# Patient Record
Sex: FEMALE | Race: OTHER | NOT HISPANIC OR LATINO | Employment: STUDENT | URBAN - METROPOLITAN AREA
[De-identification: names, ages, dates, MRNs, and addresses within clinical notes are randomized per-mention and may not be internally consistent; named-entity substitution may affect disease eponyms.]

---

## 2024-01-18 ENCOUNTER — HOSPITAL ENCOUNTER (EMERGENCY)
Facility: HOSPITAL | Age: 49
Discharge: HOME | End: 2024-01-18
Attending: EMERGENCY MEDICINE

## 2024-01-18 VITALS
DIASTOLIC BLOOD PRESSURE: 55 MMHG | RESPIRATION RATE: 18 BRPM | HEART RATE: 75 BPM | OXYGEN SATURATION: 99 % | WEIGHT: 136.69 LBS | HEIGHT: 63 IN | BODY MASS INDEX: 24.22 KG/M2 | SYSTOLIC BLOOD PRESSURE: 113 MMHG | TEMPERATURE: 98 F
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
## 2024-01-18 DIAGNOSIS — S82.832A CLOSED FRACTURE OF DISTAL END OF LEFT FIBULA, UNSPECIFIED FRACTURE MORPHOLOGY, INITIAL ENCOUNTER: Primary | ICD-10-CM

## 2024-01-18 PROCEDURE — 99283 EMERGENCY DEPT VISIT LOW MDM: CPT | Mod: 25

## 2024-01-18 PROCEDURE — 25000003 PHARM REV CODE 250: Performed by: PHYSICIAN ASSISTANT

## 2024-01-18 PROCEDURE — 29515 APPLICATION SHORT LEG SPLINT: CPT | Mod: LT

## 2024-01-18 RX ORDER — ACETAMINOPHEN 325 MG/1
650 TABLET ORAL
Status: COMPLETED | OUTPATIENT
Start: 2024-01-18 | End: 2024-01-18

## 2024-01-18 RX ADMIN — ACETAMINOPHEN 650 MG: 325 TABLET ORAL at 12:01

## 2024-01-18 NOTE — ED PROVIDER NOTES
Encounter Date: 1/18/2024       History     Chief Complaint   Patient presents with    Fall     At local [employer].  Let ankle pain.  Fall down stairs.  Roughly six steps according to patient.  No LOC.  Did not hit head.  No anti-coagulants.       48-year-old female presents ED with concern of left-sided ankle pain after injury that occurred after she slipped while walking downstairs.  Denying any other associated injuries.  Pain is sharp, worse with weight-bearing, severity 7/10.  No numbness or focal weakness.  No other acute complaints at this time.    The history is provided by the patient.     Review of patient's allergies indicates:  No Known Allergies  No past medical history on file.  No past surgical history on file.  No family history on file.     Review of Systems   Gastrointestinal:  Negative for nausea and vomiting.   Musculoskeletal:  Positive for myalgias.   Skin:  Negative for wound.   Neurological:  Negative for weakness and numbness.       Physical Exam     Initial Vitals   BP Pulse Resp Temp SpO2   01/18/24 1116 01/18/24 1116 01/18/24 1116 01/18/24 1118 01/18/24 1116   (!) 149/72 80 16 97.9 °F (36.6 °C) 100 %      MAP       --                Physical Exam    Nursing note and vitals reviewed.  Constitutional: She appears well-developed and well-nourished. She is active. She does not have a sickly appearance. She does not appear ill. No distress.   HENT:   Head: Normocephalic and atraumatic.   Musculoskeletal:      Comments: Left ankle tenderness reported over medial and lateral malleolus with localized swelling.  No large deformity noted.  Mild tenderness extending to dorsal midfoot.  Sensation intact throughout all toes.  DP pulse intact.     Neurological: She is alert. GCS eye subscore is 4. GCS verbal subscore is 5. GCS motor subscore is 6.   Skin: Skin is warm and dry.   Psychiatric: She has a normal mood and affect. Her speech is normal and behavior is normal.         ED Course    Procedures  Labs Reviewed - No data to display       Imaging Results              X-Ray Foot Complete Left (Final result)  Result time 01/18/24 12:59:47      Final result by Malachi Lehman MD (01/18/24 12:59:47)                   Impression:      1. No acute displaced fracture or dislocation of the left foot, please see separately dictated report for details of the ankle and fibular fracture.      Electronically signed by: Malachi Lehman MD  Date:    01/18/2024  Time:    12:59               Narrative:    EXAMINATION:  XR FOOT COMPLETE 3 VIEW LEFT    CLINICAL HISTORY:  .  Unspecified fall, initial encounter    TECHNIQUE:  AP, lateral and oblique views of the left foot were performed.    COMPARISON:  01/18/2024    FINDINGS:  Three views left foot.    No acute displaced fracture or dislocation of the foot.  No radiopaque foreign body.  Please see separately dictated report for details of the ankle and fibular fracture.                                       X-Ray Ankle Complete Left (Final result)  Result time 01/18/24 12:46:31      Final result by Malachi Lehman MD (01/18/24 12:46:31)                   Impression:      1. Distal fibular fracture as described.  Underlying ligamentous injury not excluded.      Electronically signed by: Malachi Lehman MD  Date:    01/18/2024  Time:    12:46               Narrative:    EXAMINATION:  XR ANKLE COMPLETE 3 VIEW LEFT    CLINICAL HISTORY:  Unspecified fall, initial encounter    TECHNIQUE:  AP, lateral and oblique views of the left ankle were performed.    COMPARISON:  None    FINDINGS:  Three views left ankle.    There is edema about the lower extremity and ankle.  There is an obliquely oriented fracture involving the distal aspect of the left fibula.  There may be slight narrowing of the medial tibiotalar interval.  No dislocation.  Several fracture fragments lie about the fibular fracture plane.                                       Medications   acetaminophen  tablet 650 mg (650 mg Oral Given 1/18/24 1205)     Medical Decision Making  Patient presents with concern of left ankle injury after slipping fall.  Afebrile.  Generalized tenderness to left ankle with localized swelling.  Neurovascularly intact.    DDx:  Including but not limited to strain, sprain, contusion, dislocation, fracture, fall    Amount and/or Complexity of Data Reviewed  Radiology: ordered. Decision-making details documented in ED Course.    Risk  OTC drugs.               ED Course as of 01/18/24 1352   Thu Jan 18, 2024   1247 X-Ray Foot Complete Left  X-ray left foot per my interpretation is noting left distal fibula fracture but no other acute fractures noted.  Pending Radiology review. [KS]   1249 X-Ray Ankle Complete Left  X-ray left ankle per my interpretation showing distal fibula fracture.  Pending Radiology review. [KS]   1348 Short-leg splint applied in ED by nurse.  Patient's supplied with crutches.  Ambulatory referral sent to Podiatry.  Encouraged nonweightbearing onto left lower extremity, Tylenol as needed, elevation, ortho/Podiatry follow-up.  ED return precautions were discussed.  Patient states her understanding and agrees with plan. [KS]      ED Course User Index  [KS] Kevin Watkins PA-C                           Clinical Impression:  Final diagnoses:  [W19.XXXA] Fall  [S82.832A] Closed fracture of distal end of left fibula, unspecified fracture morphology, initial encounter (Primary)          ED Disposition Condition    Discharge Stable          ED Prescriptions    None       Follow-up Information       Follow up With Specialties Details Why Contact Info    Your Doctor                 Kevin Watkins PA-C  01/18/24 5373

## 2024-01-18 NOTE — DISCHARGE INSTRUCTIONS

## 2024-01-30 ENCOUNTER — TELEPHONE (OUTPATIENT)
Dept: PODIATRY | Facility: CLINIC | Age: 49
End: 2024-01-30

## 2024-01-30 NOTE — TELEPHONE ENCOUNTER
Spoke with Bj Hart, Friend, of Ms Burleson who accepted an appt on her behalf for 2/1/24 at 9:45 am. Mr Tanner verbalized understanding of time and clinic location. Pt sustained a fall and had gone to ED with diagnosis of an ankle FX. No other needs voiced. Call back encouraged if needed

## 2024-01-30 NOTE — TELEPHONE ENCOUNTER
----- Message from Jeaneth Nascimento sent at 1/30/2024 11:19 AM CST -----  Type:  Needs Medical Advice    Who Called: pt   Symptoms (please be specific): pt has referral to get seen as soon as possible    Would the patient rather a call back or a response via [facility]? call  Best Call Back Number: [phone]  Additional Information:

## 2024-02-01 ENCOUNTER — TELEPHONE (OUTPATIENT)
Dept: PODIATRY | Facility: CLINIC | Age: 49
End: 2024-02-01

## 2024-02-01 ENCOUNTER — OFFICE VISIT (OUTPATIENT)
Dept: PODIATRY | Facility: CLINIC | Age: 49
End: 2024-02-01

## 2024-02-01 ENCOUNTER — PATIENT MESSAGE (OUTPATIENT)
Dept: PODIATRY | Facility: CLINIC | Age: 49
End: 2024-02-01

## 2024-02-01 ENCOUNTER — HOSPITAL ENCOUNTER (OUTPATIENT)
Dept: RADIOLOGY | Facility: HOSPITAL | Age: 49
Discharge: HOME | End: 2024-02-01
Attending: PODIATRIST

## 2024-02-01 VITALS
HEART RATE: 86 BPM | BODY MASS INDEX: 25.03 KG/M2 | WEIGHT: 136 LBS | HEIGHT: 62 IN | SYSTOLIC BLOOD PRESSURE: 111 MMHG | DIASTOLIC BLOOD PRESSURE: 77 MMHG

## 2024-02-01 DIAGNOSIS — S82.62XA CLOSED DISPLACED FRACTURE OF LATERAL MALLEOLUS OF LEFT FIBULA, INITIAL ENCOUNTER: Primary | ICD-10-CM

## 2024-02-01 DIAGNOSIS — S82.62XA CLOSED DISPLACED FRACTURE OF LATERAL MALLEOLUS OF LEFT FIBULA, INITIAL ENCOUNTER: ICD-10-CM

## 2024-02-01 PROCEDURE — 99999 PR PBB SHADOW E&M-EST. PATIENT-LVL III: CPT | Mod: PBBFAC,,, | Performed by: PODIATRIST

## 2024-02-01 PROCEDURE — 99204 OFFICE O/P NEW MOD 45 MIN: CPT | Mod: S$PBB,,, | Performed by: PODIATRIST

## 2024-02-01 PROCEDURE — 73610 X-RAY EXAM OF ANKLE: CPT | Mod: 26,LT,, | Performed by: RADIOLOGY

## 2024-02-01 PROCEDURE — 73610 X-RAY EXAM OF ANKLE: CPT | Mod: TC,PN,LT

## 2024-02-01 PROCEDURE — 99213 OFFICE O/P EST LOW 20 MIN: CPT | Mod: PBBFAC,PN | Performed by: PODIATRIST

## 2024-02-01 RX ORDER — ACETAMINOPHEN 325 MG/1
325 TABLET ORAL EVERY 6 HOURS PRN
COMMUNITY

## 2024-02-01 NOTE — PROGRESS NOTES
"Subjective:     Patient ID: Fallon Howe is a 48 y.o. female.    Chief Complaint: Ankle Injury (Left ankle injury)    Presents as a follow-up from the ED after sustaining a fall while at school on 01/18/2024.  She was placed in a compression dressing posterior splint and has been utilizing a knee walker remaining nonweightbearing.  Accompanied by her mother.  Translation per [facility]/[employer].  She has not returned to work at [employer].    Vitals:    02/01/24 0920   BP: 111/77   Pulse: 86   Weight: 61.7 kg (136 lb)   Height: 5' 2" (1.575 m)   PainSc:   2   PainLoc: Ankle      History reviewed. No pertinent past medical history.    History reviewed. No pertinent surgical history.    Family History   Problem Relation Age of Onset    No Known Problems Mother     No Known Problems Father        Social History     Socioeconomic History    Marital status: Single   Tobacco Use    Smoking status: Never    Smokeless tobacco: Never   Substance and Sexual Activity    Alcohol use: Never       Current Outpatient Medications   Medication Sig Dispense Refill    acetaminophen (TYLENOL) 325 MG tablet Take 325 mg by mouth every 6 (six) hours as needed for Pain.       No current facility-administered medications for this visit.       Review of patient's allergies indicates:  No Known Allergies      Review of Systems   Constitutional: Negative for chills, fever and malaise/fatigue.   HENT:  Negative for congestion and hearing loss.    Respiratory:  Negative for cough and shortness of breath.    Skin:  Positive for color change.   Gastrointestinal:  Negative for nausea and vomiting.   Psychiatric/Behavioral:  Negative for altered mental status.         Objective:     Physical Exam  Constitutional:       General: She is not in acute distress.     Appearance: Normal appearance. She is not ill-appearing.   Cardiovascular:      Pulses:           Dorsalis pedis pulses are 2+ on the left side.        Posterior tibial pulses are " 2+ on the left side.   Musculoskeletal:      Comments: Pain on palpation overlying the lateral malleolus however no significant pain on palpation overlying the anterior joint line or the medial gutter of the left ankle.  There is no significant pain with passive external rotation of the foot and negative anterior drawer sign left ankle.  There is some localized edema with resolving ecchymosis to left ankle.  Active range motion somewhat reduced but intact to the left ankle.   Skin:     General: Skin is warm.      Capillary Refill: Capillary refill takes less than 2 seconds.      Findings: Ecchymosis present.      Nails: There is no clubbing.   Neurological:      Mental Status: She is alert and oriented to person, place, and time.      Sensory: Sensation is intact.      Motor: Motor function is intact.           Assessment:      Encounter Diagnosis   Name Primary?    Closed displaced fracture of lateral malleolus of left fibula, initial encounter Yes     Plan:     Fallon was seen today for ankle injury.    Diagnoses and all orders for this visit:    Closed displaced fracture of lateral malleolus of left fibula, initial encounter  -     X-Ray Ankle Complete Left; Future  -     X-Ray Ankle Complete Left; Future      I counseled the patient on her conditions, their implications and medical management.    Reviewed left ankle x-ray completed on 01/18/2024 noting left ankle lateral malleolus spiral oblique fracture with mild displacement posteriorly, laterally and proximally.  Minimal increased gapping noted the medial gutter with overall stable alignment of the left ankle.  No significant instability detected on physical exam.      We discussed continued conservative care such as casting versus immobilization in a tall cam boot versus surgical intervention consisting of ORIF.  Risks and benefits of both approaches discussed and patient elected to continue conservative care at this time.      Dispensed, fitted and applied  tall Cam boot to left lower extremity.  Patient is permitted to remove the boot twice a day to perform simple range motion exercises consisting of plantar flexion and dorsiflexion to left ankle.  She is to sleep with the boot on and treated as a cast.      Patient may transition to partial weight-bearing with crutches or walker for short distances as tolerated.  We discussed avoiding pivoting on the left foot.      Left ankle x-ray to be completed today with subsequent x-ray in 2 weeks with follow up at that time.    Assisted by Yaquelin Dorado DPM PGY2    A portion of this note was generated by voice recognition software and may contain spelling and grammar errors.  .

## 2024-02-07 ENCOUNTER — TELEPHONE (OUTPATIENT)
Dept: PODIATRY | Facility: CLINIC | Age: 49
End: 2024-02-07

## 2024-02-07 ENCOUNTER — PATIENT MESSAGE (OUTPATIENT)
Dept: PODIATRY | Facility: CLINIC | Age: 49
End: 2024-02-07

## 2024-02-07 NOTE — TELEPHONE ENCOUNTER
Pt's FMLA forms were scanned into chart under media manager and faxed to [employer] [phone]. Confirmation was received. Pt notified.

## 2024-02-15 ENCOUNTER — OFFICE VISIT (OUTPATIENT)
Dept: PODIATRY | Facility: CLINIC | Age: 49
End: 2024-02-15

## 2024-02-15 ENCOUNTER — HOSPITAL ENCOUNTER (OUTPATIENT)
Dept: RADIOLOGY | Facility: HOSPITAL | Age: 49
Discharge: HOME | End: 2024-02-15
Attending: PODIATRIST

## 2024-02-15 VITALS
DIASTOLIC BLOOD PRESSURE: 79 MMHG | BODY MASS INDEX: 25.03 KG/M2 | HEIGHT: 62 IN | WEIGHT: 136 LBS | SYSTOLIC BLOOD PRESSURE: 116 MMHG | HEART RATE: 87 BPM

## 2024-02-15 DIAGNOSIS — S82.62XA CLOSED DISPLACED FRACTURE OF LATERAL MALLEOLUS OF LEFT FIBULA, INITIAL ENCOUNTER: ICD-10-CM

## 2024-02-15 DIAGNOSIS — S82.832D CLOSED FRACTURE OF DISTAL END OF LEFT FIBULA WITH ROUTINE HEALING, UNSPECIFIED FRACTURE MORPHOLOGY, SUBSEQUENT ENCOUNTER: ICD-10-CM

## 2024-02-15 PROCEDURE — 73610 X-RAY EXAM OF ANKLE: CPT | Mod: 26,LT,, | Performed by: RADIOLOGY

## 2024-02-15 PROCEDURE — 99214 OFFICE O/P EST MOD 30 MIN: CPT | Mod: PBBFAC,25,PN | Performed by: PODIATRIST

## 2024-02-15 PROCEDURE — 73610 X-RAY EXAM OF ANKLE: CPT | Mod: TC,PN,LT

## 2024-02-15 PROCEDURE — 99213 OFFICE O/P EST LOW 20 MIN: CPT | Mod: S$PBB,,, | Performed by: PODIATRIST

## 2024-02-15 PROCEDURE — 99999 PR PBB SHADOW E&M-EST. PATIENT-LVL IV: CPT | Mod: PBBFAC,,, | Performed by: PODIATRIST

## 2024-02-15 NOTE — PROGRESS NOTES
"Subjective:     Patient ID: Fallon Howe is a 48 y.o. female.    Chief Complaint: Ankle Injury (2 week f/u left ankle injury)    Presents as a follow-up from the ED after sustaining a fall while at school on 01/18/2024.  She was placed in a compression dressing posterior splint and has been utilizing a knee walker remaining nonweightbearing.  Accompanied by her mother.  Translation per [facility]/[employer].  She has not returned to work at [employer].    02/15/2024:  Follow-up for minimally displaced left lateral malleolus fracture.  Patient is nonweightbearing with tall cam boot and rolling knee scooter.  Reports minimal discomfort rated at 2/10.  Denies any slips, trips or falls.  Accompanied by her mother.  Translation per [facility]/[employer].    Vitals:    02/15/24 1036   BP: 116/79   Pulse: 87   Weight: 61.7 kg (136 lb)   Height: 5' 2" (1.575 m)   PainSc:   2   PainLoc: Ankle      History reviewed. No pertinent past medical history.    History reviewed. No pertinent surgical history.    Family History   Problem Relation Age of Onset    No Known Problems Mother     No Known Problems Father        Social History     Socioeconomic History    Marital status: Single   Tobacco Use    Smoking status: Never    Smokeless tobacco: Never   Substance and Sexual Activity    Alcohol use: Never       Current Outpatient Medications   Medication Sig Dispense Refill    acetaminophen (TYLENOL) 325 MG tablet Take 325 mg by mouth every 6 (six) hours as needed for Pain.       No current facility-administered medications for this visit.       Review of patient's allergies indicates:  No Known Allergies      Review of Systems   Constitutional: Negative for chills, fever and malaise/fatigue.   HENT:  Negative for congestion and hearing loss.    Respiratory:  Negative for cough and shortness of breath.    Skin:  Positive for color change.   Gastrointestinal:  Negative for nausea and vomiting. "   Psychiatric/Behavioral:  Negative for altered mental status.         Objective:     Physical Exam  Constitutional:       General: She is not in acute distress.     Appearance: Normal appearance. She is not ill-appearing.   Cardiovascular:      Pulses:           Dorsalis pedis pulses are 2+ on the left side.        Posterior tibial pulses are 2+ on the left side.   Musculoskeletal:      Comments: No pain on palpation overlying the lateral malleolus and no significant pain on palpation overlying the anterior joint line or the medial gutter of the left ankle.  There is no significant pain with passive external rotation of the foot and negative anterior drawer sign left ankle.  Persistent localized edema to left ankle improved compared to last exam.  Previous ecchymosis resolved.  Active range motion intact to the left ankle.   Skin:     General: Skin is warm.      Capillary Refill: Capillary refill takes less than 2 seconds.      Findings: No ecchymosis.      Nails: There is no clubbing.   Neurological:      Mental Status: She is alert and oriented to person, place, and time.      Sensory: Sensation is intact.      Motor: Motor function is intact.           Assessment:      Encounter Diagnosis   Name Primary?    Closed fracture of distal end of left fibula with routine healing, unspecified fracture morphology, subsequent encounter      Plan:     Fallon was seen today for ankle injury.    Diagnoses and all orders for this visit:    Closed fracture of distal end of left fibula with routine healing, unspecified fracture morphology, subsequent encounter  -     Ambulatory referral/consult to Podiatry  -     X-Ray Ankle Complete Left; Future      I counseled the patient on her conditions, their implications and medical management.    Reviewed left ankle x-ray completed today noting maintained alignment of the left ankle no significant displacement.  There is moderate trabeculation crossing the spiral oblique fracture at  the level the syndesmosis indicating signs of persistent healing.    To begin full weight-bearing as tolerated with tall cam boot to the left lower extremity.  She may remove the boot at rest.  Continue elevation at rest and ice as needed.      We reviewed active range motion exercises to be performed at rest.      RTC within 4 weeks for follow-up weight-bearing x-ray or p.r.n. as discussed.  At that point most likely she will transition out of the boot into a shoe.  Patient has expressed that she does not want to go to physical therapy.    Assisted by Yaquelin Dorado DPM PGY 2    A portion of this note was generated by voice recognition software and may contain spelling and grammar errors.  .

## 2024-03-04 ENCOUNTER — TELEPHONE (OUTPATIENT)
Dept: PODIATRY | Facility: CLINIC | Age: 49
End: 2024-03-04

## 2024-03-04 ENCOUNTER — PATIENT MESSAGE (OUTPATIENT)
Dept: PODIATRY | Facility: CLINIC | Age: 49
End: 2024-03-04

## 2024-03-04 NOTE — TELEPHONE ENCOUNTER
Pt's FMLA forms were scanned into chart under  and faxed to [employer] [phone]. Confirmation was received. Pt notified.

## 2024-03-04 NOTE — TELEPHONE ENCOUNTER
Mr Darren calling on behalf of Ms Fallon Aguilar in regards to her [facility] paperwork. Reports that they dropped her paperwork off on Friday for it to be processed. Inquiring if it was recevied. Informed Mr Banks that I had not received any new paperwork from [employer] but I will double check my desk and inquire with my colleagues. Let him know that I will call him back. No other needs voiced at this time.

## 2024-03-04 NOTE — TELEPHONE ENCOUNTER
Spoke with  Tanner to let him know that the form was received and completed by Dr Meier, and I will forward the information to our [facility] coordinator and if they need a copy of the form for Ms Howe's records it will be available to . No other needs voiced. Call back encouraged if needed.

## 2024-03-14 ENCOUNTER — OFFICE VISIT (OUTPATIENT)
Dept: PODIATRY | Facility: CLINIC | Age: 49
End: 2024-03-14

## 2024-03-14 ENCOUNTER — HOSPITAL ENCOUNTER (OUTPATIENT)
Dept: RADIOLOGY | Facility: HOSPITAL | Age: 49
Discharge: HOME | End: 2024-03-14
Attending: PODIATRIST

## 2024-03-14 VITALS
DIASTOLIC BLOOD PRESSURE: 82 MMHG | HEART RATE: 78 BPM | HEIGHT: 62 IN | WEIGHT: 136 LBS | BODY MASS INDEX: 25.03 KG/M2 | SYSTOLIC BLOOD PRESSURE: 115 MMHG

## 2024-03-14 DIAGNOSIS — S82.832D CLOSED FRACTURE OF DISTAL END OF LEFT FIBULA WITH ROUTINE HEALING, UNSPECIFIED FRACTURE MORPHOLOGY, SUBSEQUENT ENCOUNTER: Primary | ICD-10-CM

## 2024-03-14 DIAGNOSIS — S82.832D CLOSED FRACTURE OF DISTAL END OF LEFT FIBULA WITH ROUTINE HEALING, UNSPECIFIED FRACTURE MORPHOLOGY, SUBSEQUENT ENCOUNTER: ICD-10-CM

## 2024-03-14 DIAGNOSIS — M25.572 LEFT ANKLE PAIN, UNSPECIFIED CHRONICITY: ICD-10-CM

## 2024-03-14 PROCEDURE — 99213 OFFICE O/P EST LOW 20 MIN: CPT | Mod: S$PBB,,, | Performed by: PODIATRIST

## 2024-03-14 PROCEDURE — 99214 OFFICE O/P EST MOD 30 MIN: CPT | Mod: PBBFAC,25,PN | Performed by: PODIATRIST

## 2024-03-14 PROCEDURE — 73610 X-RAY EXAM OF ANKLE: CPT | Mod: 26,LT,, | Performed by: RADIOLOGY

## 2024-03-14 PROCEDURE — 99999 PR PBB SHADOW E&M-EST. PATIENT-LVL IV: CPT | Mod: PBBFAC,,, | Performed by: PODIATRIST

## 2024-03-14 PROCEDURE — 73610 X-RAY EXAM OF ANKLE: CPT | Mod: TC,PN,LT

## 2024-03-14 NOTE — PROGRESS NOTES
"Subjective:     Patient ID: Fallon Howe is a 48 y.o. female.    Chief Complaint: Foot Injury (Follow up injury to left foot/left ankle)    Presents as a follow-up from the ED after sustaining a fall while at school on 01/18/2024.  She was placed in a compression dressing posterior splint and has been utilizing a knee walker remaining nonweightbearing.  Accompanied by her mother.  Translation per [facility]/[employer].  She has not returned to work at [employer].    02/15/2024:  Follow-up for minimally displaced left lateral malleolus fracture.  Patient is nonweightbearing with tall cam boot and rolling knee scooter.  Reports minimal discomfort rated at 2/10.  Denies any slips, trips or falls.  Accompanied by her mother.  Translation per [facility]/[employer].    03/14/2024:  Follow-up for left ankle fibular fracture.  Relates pain 1/10.  She has not returned to work she is unable to stand for more than 20 minutes at a time with her boot in his requiring more time off from work.  Denies any slips, trips or falls.  Ambulating with a Cam boot as instructed.  Translation per [facility].    Vitals:    03/14/24 0825   BP: 115/82   Pulse: 78   Weight: 61.7 kg (136 lb)   Height: 5' 2" (1.575 m)   PainSc:   1   PainLoc: Ankle      History reviewed. No pertinent past medical history.    History reviewed. No pertinent surgical history.    Family History   Problem Relation Age of Onset    No Known Problems Mother     No Known Problems Father        Social History     Socioeconomic History    Marital status: Single   Tobacco Use    Smoking status: Never    Smokeless tobacco: Never   Substance and Sexual Activity    Alcohol use: Never       Current Outpatient Medications   Medication Sig Dispense Refill    acetaminophen (TYLENOL) 325 MG tablet Take 325 mg by mouth every 6 (six) hours as needed for Pain.       No current facility-administered medications for this visit.       Review of " patient's allergies indicates:  No Known Allergies      Review of Systems   Constitutional: Negative for chills, fever and malaise/fatigue.   HENT:  Negative for congestion and hearing loss.    Respiratory:  Negative for cough and shortness of breath.    Skin:  Positive for color change.   Gastrointestinal:  Negative for nausea and vomiting.   Psychiatric/Behavioral:  Negative for altered mental status.         Objective:     Physical Exam  Constitutional:       General: She is not in acute distress.     Appearance: Normal appearance. She is not ill-appearing.   Cardiovascular:      Pulses:           Dorsalis pedis pulses are 2+ on the left side.        Posterior tibial pulses are 2+ on the left side.   Musculoskeletal:      Comments: Localized pain on palpation overlying the lateral malleolus and no significant pain on palpation overlying the anterior joint line or the medial gutter of the left ankle.  There is no significant pain with passive external rotation of the foot and negative anterior drawer sign left ankle.  Persistent mild edema to left ankle improved compared to last exam.  Active range motion intact to the left ankle.  Negative anterior drawer sign left ankle and no pain stress inversion or eversion of the left ankle.   Skin:     General: Skin is warm.      Capillary Refill: Capillary refill takes less than 2 seconds.      Findings: No ecchymosis.      Nails: There is no clubbing.   Neurological:      Mental Status: She is alert and oriented to person, place, and time.      Sensory: Sensation is intact.      Motor: Motor function is intact.           Assessment:      Encounter Diagnoses   Name Primary?    Closed fracture of distal end of left fibula with routine healing, unspecified fracture morphology, subsequent encounter Yes    Left ankle pain, unspecified chronicity      Plan:     Fallon was seen today for foot injury.    Diagnoses and all orders for this visit:    Closed fracture of distal end of  left fibula with routine healing, unspecified fracture morphology, subsequent encounter  -     X-Ray Ankle Complete Left; Future    Left ankle pain, unspecified chronicity  -     X-Ray Ankle Complete Left; Future      I counseled the patient on her conditions, their implications and medical management.    Reviewed left ankle x-ray noting moderate increase in bone callus and a change in course trabecular pattern across the fibular fracture site which is at the level the syndesmosis with maintained alignment.  There is a small avulsion fracture fragment of the posterior aspect of the tibia consistent most likely with a posterior mallet fracture this is less than 10% of the articular surface.  No clinical instability noted.      Patient is cleared to begin transitioning out of her orthopedic boot into a tennis shoe beginning on 03/28/2024 with 30 minute daily increments as advised.  She is to avoid any high impact activity.      RTC within 4 weeks to re-evaluate or p.r.n. as discussed.  Letter dispensed on the patient's behalf stating that she is unable to return to work until she is re-evaluated within 1 month.    Assisted by Yaquelin Dorado DPM PGY 2    A portion of this note was generated by voice recognition software and may contain spelling and grammar errors.  .

## 2024-03-14 NOTE — PATIENT INSTRUCTIONS
On 03/28 begin gradual 30 minute daily increments to transition to a tennis shoe.    May participate in low impact exercise such as stationary bike, elliptical and swimming.     Avoid high impact activities such as running, jumping and squatting.

## 2024-04-11 ENCOUNTER — HOSPITAL ENCOUNTER (OUTPATIENT)
Dept: RADIOLOGY | Facility: HOSPITAL | Age: 49
Discharge: HOME | End: 2024-04-11
Attending: PODIATRIST

## 2024-04-11 ENCOUNTER — OFFICE VISIT (OUTPATIENT)
Dept: PODIATRY | Facility: CLINIC | Age: 49
End: 2024-04-11

## 2024-04-11 VITALS
DIASTOLIC BLOOD PRESSURE: 82 MMHG | BODY MASS INDEX: 25.03 KG/M2 | WEIGHT: 136 LBS | SYSTOLIC BLOOD PRESSURE: 127 MMHG | HEART RATE: 102 BPM | HEIGHT: 62 IN

## 2024-04-11 DIAGNOSIS — M25.672 ANKLE JOINT STIFFNESS, LEFT: ICD-10-CM

## 2024-04-11 DIAGNOSIS — S82.832D CLOSED FRACTURE OF DISTAL END OF LEFT FIBULA WITH ROUTINE HEALING, UNSPECIFIED FRACTURE MORPHOLOGY, SUBSEQUENT ENCOUNTER: Primary | ICD-10-CM

## 2024-04-11 DIAGNOSIS — S82.832D CLOSED FRACTURE OF DISTAL END OF LEFT FIBULA WITH ROUTINE HEALING, UNSPECIFIED FRACTURE MORPHOLOGY, SUBSEQUENT ENCOUNTER: ICD-10-CM

## 2024-04-11 DIAGNOSIS — M25.572 LEFT ANKLE PAIN, UNSPECIFIED CHRONICITY: ICD-10-CM

## 2024-04-11 DIAGNOSIS — M62.81 MUSCLE WEAKNESS OF LOWER EXTREMITY: ICD-10-CM

## 2024-04-11 PROCEDURE — 73610 X-RAY EXAM OF ANKLE: CPT | Mod: TC,PN,LT

## 2024-04-11 PROCEDURE — 73610 X-RAY EXAM OF ANKLE: CPT | Mod: 26,LT,, | Performed by: RADIOLOGY

## 2024-04-11 PROCEDURE — 99214 OFFICE O/P EST MOD 30 MIN: CPT | Mod: S$PBB,,, | Performed by: PODIATRIST

## 2024-04-11 PROCEDURE — 99214 OFFICE O/P EST MOD 30 MIN: CPT | Mod: PBBFAC,25,PN | Performed by: PODIATRIST

## 2024-04-11 PROCEDURE — 99999 PR PBB SHADOW E&M-EST. PATIENT-LVL IV: CPT | Mod: PBBFAC,,, | Performed by: PODIATRIST

## 2024-04-11 RX ORDER — FERROUS SULFATE, DRIED 160(50) MG
1 TABLET, EXTENDED RELEASE ORAL 2 TIMES DAILY WITH MEALS
Qty: 60 TABLET | Refills: 11 | Status: SHIPPED | OUTPATIENT
Start: 2024-04-11 | End: 2025-04-11

## 2024-04-11 NOTE — PROGRESS NOTES
"Subjective:     Patient ID: Fallon Howe is a 48 y.o. female.    Chief Complaint: Follow-up (1 month f/u closed fx of distal end of left fibula)    Presents as a follow-up from the ED after sustaining a fall while at school on 01/18/2024.  She was placed in a compression dressing posterior splint and has been utilizing a knee walker remaining nonweightbearing.  Accompanied by her mother.  Translation per [facility].  She has not returned to work at [employer].    02/15/2024:  Follow-up for minimally displaced left lateral malleolus fracture.  Patient is nonweightbearing with tall cam boot and rolling knee scooter.  Reports minimal discomfort rated at 2/10.  Denies any slips, trips or falls.  Accompanied by her mother.  Translation per [facility].    03/14/2024:  Follow-up for left ankle fibular fracture.  Relates pain 1/10.  She has not returned to work she is unable to stand for more than 20 minutes at a time with her boot in his requiring more time off from work.  Denies any slips, trips or falls.  Ambulating with a Cam boot as instructed.  Translation per [facility].    04/11/2024:  Follow-up for closed left ankle fracture.  Relates pain is 2/10.  Relates she is unable to return to work because she can not sit down in his force to stand for extended periods of time as a .  Wearing her cam boot.  Accompanied by her mother.  Translation per [facility].    Vitals:    04/11/24 0818   BP: 127/82   Pulse: 102   Weight: 61.7 kg (136 lb)   Height: 5' 2" (1.575 m)   PainSc:   2   PainLoc: Foot      Past Medical History:   Diagnosis Date    Closed fracture of distal end of left fibula 01/18/2024       History reviewed. No pertinent surgical history.    Family History   Problem Relation Age of Onset    No Known Problems Mother     No Known Problems Father        Social History     Socioeconomic History    Marital status: Single   Tobacco Use "    Smoking status: Never    Smokeless tobacco: Never   Substance and Sexual Activity    Alcohol use: Never       Current Outpatient Medications   Medication Sig Dispense Refill    acetaminophen (TYLENOL) 325 MG tablet Take 325 mg by mouth every 6 (six) hours as needed for Pain.      calcium-vitamin D3 (CALCIUM 500 + D) 500 mg-5 mcg (200 unit) per tablet Take 1 tablet by mouth 2 (two) times daily with meals. 60 tablet 11     No current facility-administered medications for this visit.       Review of patient's allergies indicates:  No Known Allergies      Review of Systems   Constitutional: Negative for chills, fever and malaise/fatigue.   HENT:  Negative for congestion and hearing loss.    Respiratory:  Negative for cough and shortness of breath.    Skin:  Positive for color change.   Gastrointestinal:  Negative for nausea and vomiting.   Psychiatric/Behavioral:  Negative for altered mental status.         Objective:     Physical Exam  Constitutional:       General: She is not in acute distress.     Appearance: Normal appearance. She is not ill-appearing.   Cardiovascular:      Pulses:           Dorsalis pedis pulses are 2+ on the left side.        Posterior tibial pulses are 2+ on the left side.   Musculoskeletal:      Comments: Localized pain on palpation along the distal anterior aspect of the left fibula/lateral malleolus with slight pain during stress inversion and no pain during anterior drawer testing.  There is no significant pain with passive external rotation of the foot and negative anterior drawer sign left ankle.  Persistent mild edema to left ankle improved compared to last exam.  Active range motion intact to the left ankle.  Active range motion is somewhat limited secondary to stiffness but does not appear to be painful and passive range motion appears to be within normal limits.   Skin:     General: Skin is warm.      Capillary Refill: Capillary refill takes less than 2 seconds.      Findings: No  ecchymosis.      Nails: There is no clubbing.   Neurological:      Mental Status: She is alert and oriented to person, place, and time.      Sensory: Sensation is intact.      Motor: Motor function is intact.           Assessment:      Encounter Diagnoses   Name Primary?    Closed fracture of distal end of left fibula with routine healing, unspecified fracture morphology, subsequent encounter Yes    Muscle weakness of lower extremity     Ankle joint stiffness, left      Plan:     Fallon was seen today for follow-up.    Diagnoses and all orders for this visit:    Closed fracture of distal end of left fibula with routine healing, unspecified fracture morphology, subsequent encounter  -     X-Ray Ankle Complete Left; Future  -     Ambulatory referral/consult to Physical/Occupational Therapy; Future    Muscle weakness of lower extremity  -     Ambulatory referral/consult to Physical/Occupational Therapy; Future    Ankle joint stiffness, left  -     Ambulatory referral/consult to Physical/Occupational Therapy; Future    Other orders  -     calcium-vitamin D3 (CALCIUM 500 + D) 500 mg-5 mcg (200 unit) per tablet; Take 1 tablet by mouth 2 (two) times daily with meals.      I counseled the patient on her conditions, their implications and medical management.     Once again I have told the patient that she is cleared to begin transitioning out of her orthopedic boot into a tennis shoe with 30 minute daily increments.  I have referred her to physical therapy to begin light resistance progressing under the guidance of the skull therapy as well as in forcing active range motion exercises throughout the day.  We discussed performing the alphabet which would be 26 repetitions in addition to plantar flexion and dorsiflexion.  We discussed avoiding pivoting on the left foot in single stance phase in any high impact activity at this time.  Patient expressed that she is unable to return to work since she can not stand for the duration  of the day and is not permitted to sit.  Therefore patient will return to clinic within 4 weeks for re-evaluation and will remain out of work during this time.  Short-term disability paperwork completed on the patient's behalf.      In order to keep the cost down I am not going to screen for vitamin-D deficiency but due to the delayed complete bridging of her fracture I am prescribing vitamin-D and calcium twice daily supplementation.      Reviewed left ankle x-ray noting maintained alignment of the ankle mortise with a proximally 50% consolidation of the oblique fibular fracture that is at the level the syndesmosis with bone callus also surrounding the remaining portion that has not fully consolidated.  Overall clinically the ankle is stable.     Assisted per Eusebio Hooper DPM PGY 3    A portion of this note was generated by voice recognition software and may contain spelling and grammar errors.  .

## 2024-04-11 NOTE — PATIENT INSTRUCTIONS
May begin gradual 30 minute daily increments to transition to a tennis shoe.    May participate in low impact exercise such as stationary bike, elliptical and swimming.     Avoid high impact activities such as running, jumping and squatting.

## 2024-05-13 ENCOUNTER — HOSPITAL ENCOUNTER (OUTPATIENT)
Dept: RADIOLOGY | Facility: HOSPITAL | Age: 49
Discharge: HOME | End: 2024-05-13
Attending: PODIATRIST

## 2024-05-13 ENCOUNTER — OFFICE VISIT (OUTPATIENT)
Dept: PODIATRY | Facility: CLINIC | Age: 49
End: 2024-05-13

## 2024-05-13 VITALS
HEIGHT: 62 IN | BODY MASS INDEX: 25.03 KG/M2 | DIASTOLIC BLOOD PRESSURE: 83 MMHG | SYSTOLIC BLOOD PRESSURE: 118 MMHG | HEART RATE: 104 BPM | WEIGHT: 136 LBS

## 2024-05-13 DIAGNOSIS — S82.832D CLOSED FRACTURE OF DISTAL END OF LEFT FIBULA WITH ROUTINE HEALING, UNSPECIFIED FRACTURE MORPHOLOGY, SUBSEQUENT ENCOUNTER: Primary | ICD-10-CM

## 2024-05-13 DIAGNOSIS — S82.832D CLOSED FRACTURE OF DISTAL END OF LEFT FIBULA WITH ROUTINE HEALING, UNSPECIFIED FRACTURE MORPHOLOGY, SUBSEQUENT ENCOUNTER: ICD-10-CM

## 2024-05-13 PROCEDURE — 99213 OFFICE O/P EST LOW 20 MIN: CPT | Mod: PBBFAC,25,PN | Performed by: PODIATRIST

## 2024-05-13 PROCEDURE — 99214 OFFICE O/P EST MOD 30 MIN: CPT | Mod: S$PBB,,, | Performed by: PODIATRIST

## 2024-05-13 PROCEDURE — 73610 X-RAY EXAM OF ANKLE: CPT | Mod: 26,LT,, | Performed by: RADIOLOGY

## 2024-05-13 PROCEDURE — 73610 X-RAY EXAM OF ANKLE: CPT | Mod: TC,PN,LT

## 2024-05-13 PROCEDURE — 99999 PR PBB SHADOW E&M-EST. PATIENT-LVL III: CPT | Mod: PBBFAC,,, | Performed by: PODIATRIST

## 2024-05-13 NOTE — PROGRESS NOTES
"Subjective:     Patient ID: Fallon Howe is a 48 y.o. female.    Chief Complaint: Ankle Injury (F/u left ankle injury)    Presents as a follow-up from the ED after sustaining a fall while at school on 01/18/2024.  She was placed in a compression dressing posterior splint and has been utilizing a knee walker remaining nonweightbearing.  Accompanied by her mother.  Translation per [facility].  She has not returned to work at [employer].    02/15/2024:  Follow-up for minimally displaced left lateral malleolus fracture.  Patient is nonweightbearing with tall cam boot and rolling knee scooter.  Reports minimal discomfort rated at 2/10.  Denies any slips, trips or falls.  Accompanied by her mother.  Translation per [facility].    03/14/2024:  Follow-up for left ankle fibular fracture.  Relates pain 1/10.  She has not returned to work she is unable to stand for more than 20 minutes at a time with her boot in his requiring more time off from work.  Denies any slips, trips or falls.  Ambulating with a Cam boot as instructed.  Translation per [facility].    04/11/2024:  Follow-up for closed left ankle fracture.  Relates pain is 2/10.  Relates she is unable to return to work because she can not sit down in his force to stand for extended periods of time as a .  Wearing her cam boot.  Accompanied by her mother.  Translation per [facility].    05/13/2024:  Follow-up for left ankle lateral malleolus fracture.  Patient is ambulating with cam boot.  She describes mild intermittent aching pain rated at 2/10.  Inquiring about return to work.  Translation per [facility].    Vitals:    05/13/24 1318   BP: 118/83   Pulse: 104   Weight: 61.7 kg (136 lb)   Height: 5' 2" (1.575 m)   PainSc:   2   PainLoc: Ankle      Past Medical History:   Diagnosis Date    Closed fracture of distal end of left fibula 01/18/2024       History reviewed. " No pertinent surgical history.    Family History   Problem Relation Name Age of Onset    No Known Problems Mother      No Known Problems Father         Social History     Socioeconomic History    Marital status: Single   Tobacco Use    Smoking status: Never    Smokeless tobacco: Never   Substance and Sexual Activity    Alcohol use: Never       Current Outpatient Medications   Medication Sig Dispense Refill    acetaminophen (TYLENOL) 325 MG tablet Take 325 mg by mouth every 6 (six) hours as needed for Pain.      calcium-vitamin D3 (CALCIUM 500 + D) 500 mg-5 mcg (200 unit) per tablet Take 1 tablet by mouth 2 (two) times daily with meals. 60 tablet 11     No current facility-administered medications for this visit.       Review of patient's allergies indicates:  No Known Allergies      Review of Systems   Constitutional: Negative for chills, fever and malaise/fatigue.   HENT:  Negative for congestion and hearing loss.    Respiratory:  Negative for cough and shortness of breath.    Skin:  Positive for color change.   Gastrointestinal:  Negative for nausea and vomiting.   Psychiatric/Behavioral:  Negative for altered mental status.         Objective:     Physical Exam  Constitutional:       General: She is not in acute distress.     Appearance: Normal appearance. She is not ill-appearing.   Cardiovascular:      Pulses:           Dorsalis pedis pulses are 2+ on the left side.        Posterior tibial pulses are 2+ on the left side.   Musculoskeletal:      Comments: Slight discomfort on palpation overlying lateral malleolus with no pain during stress inversion and no pain during anterior drawer testing.  Mild pain on palpation overlying the lateral gutter of the left ankle.  There is no significant pain with passive external rotation of the foot and negative anterior drawer sign left ankle.  No significant edema to left ankle.  Active range motion intact to the left ankle.  Active range motion is somewhat limited secondary  to stiffness but does not appear to be painful and passive range motion appears to be within normal limits.    Mild pain on palpation overlying the peroneal tendons slightly inferior and posterior to the lateral malleolus left ankle.  Active eversion to the left foot intact with minimal discomfort.   Skin:     General: Skin is warm.      Capillary Refill: Capillary refill takes less than 2 seconds.      Findings: No ecchymosis.      Nails: There is no clubbing.   Neurological:      Mental Status: She is alert and oriented to person, place, and time.      Sensory: Sensation is intact.      Motor: Motor function is intact.           Assessment:      Encounter Diagnosis   Name Primary?    Closed fracture of distal end of left fibula with routine healing, unspecified fracture morphology, subsequent encounter Yes     Plan:     Fallon was seen today for ankle injury.    Diagnoses and all orders for this visit:    Closed fracture of distal end of left fibula with routine healing, unspecified fracture morphology, subsequent encounter  -     X-Ray Ankle Complete Left; Future      I counseled the patient on her conditions, their implications and medical management.    Reviewed left ankle x-ray noting increased trabeculation of the oblique lateral malleolus fracture at the level of the syndesmosis with maintained alignment of the ankle mortise.  Patient has a proximally 50% consolidation which could be a little bit more since the last time.  Again we discussed gradual transition out of the cam boot into a tennis shoe as tolerated.  Reviewed activity restrictions and modifications prevent pivoting on a single leg and avoiding squatting and stooping.  I have permitted patient returned to work on 05/14/2024 and requested that she be permitted to use the cam boot as needed and to sit on a chair as needed.    Patient declined referral to physical therapy.      Patient to continue vitamin-D and calcium supplementation.      RTC  within 6 weeks for follow-up weight-bearing left ankle x-ray or p.r.n. as discussed.  Return to work paperwork completed on patient's behalf today.      Assisted per Eusebio Hooper DPM PGY 3    A portion of this note was generated by voice recognition software and may contain spelling and grammar errors.  .

## 2024-05-29 ENCOUNTER — PATIENT MESSAGE (OUTPATIENT)
Dept: PODIATRY | Facility: CLINIC | Age: 49
End: 2024-05-29

## 2024-06-24 ENCOUNTER — HOSPITAL ENCOUNTER (OUTPATIENT)
Dept: RADIOLOGY | Facility: HOSPITAL | Age: 49
Discharge: HOME | End: 2024-06-24
Attending: PODIATRIST

## 2024-06-24 ENCOUNTER — OFFICE VISIT (OUTPATIENT)
Dept: PODIATRY | Facility: CLINIC | Age: 49
End: 2024-06-24

## 2024-06-24 VITALS
HEART RATE: 99 BPM | DIASTOLIC BLOOD PRESSURE: 83 MMHG | HEIGHT: 62 IN | WEIGHT: 136 LBS | BODY MASS INDEX: 25.03 KG/M2 | SYSTOLIC BLOOD PRESSURE: 116 MMHG

## 2024-06-24 DIAGNOSIS — S82.832G CLOSED FRACTURE OF DISTAL END OF LEFT FIBULA WITH DELAYED HEALING, UNSPECIFIED FRACTURE MORPHOLOGY, SUBSEQUENT ENCOUNTER: Primary | ICD-10-CM

## 2024-06-24 DIAGNOSIS — S82.832D CLOSED FRACTURE OF DISTAL END OF LEFT FIBULA WITH ROUTINE HEALING, UNSPECIFIED FRACTURE MORPHOLOGY, SUBSEQUENT ENCOUNTER: ICD-10-CM

## 2024-06-24 PROCEDURE — 99999 PR PBB SHADOW E&M-EST. PATIENT-LVL III: CPT | Mod: PBBFAC,,, | Performed by: PODIATRIST

## 2024-06-24 PROCEDURE — 99213 OFFICE O/P EST LOW 20 MIN: CPT | Mod: PBBFAC,25,PN | Performed by: PODIATRIST

## 2024-06-24 PROCEDURE — 73610 X-RAY EXAM OF ANKLE: CPT | Mod: TC,PN,LT

## 2024-06-24 PROCEDURE — 73610 X-RAY EXAM OF ANKLE: CPT | Mod: 26,LT,, | Performed by: RADIOLOGY

## 2024-06-24 PROCEDURE — 99213 OFFICE O/P EST LOW 20 MIN: CPT | Mod: S$PBB,,, | Performed by: PODIATRIST

## 2024-06-24 RX ORDER — FERROUS SULFATE, DRIED 160(50) MG
1 TABLET, EXTENDED RELEASE ORAL 2 TIMES DAILY WITH MEALS
Qty: 60 TABLET | Refills: 11 | Status: SHIPPED | OUTPATIENT
Start: 2024-06-24 | End: 2025-06-24

## 2024-06-24 NOTE — PROGRESS NOTES
Subjective:     Patient ID: Fallon Howe is a 48 y.o. female.    Chief Complaint: Foot Pain (Complains of swelling.  She is back to work and at the end of her shift it is very swollen. )    Presents as a follow-up from the ED after sustaining a fall while at school on 01/18/2024.  She was placed in a compression dressing posterior splint and has been utilizing a knee walker remaining nonweightbearing.  Accompanied by her mother.  Translation per [facility].  She has not returned to work at [employer].    02/15/2024:  Follow-up for minimally displaced left lateral malleolus fracture.  Patient is nonweightbearing with tall cam boot and rolling knee scooter.  Reports minimal discomfort rated at 2/10.  Denies any slips, trips or falls.  Accompanied by her mother.  Translation per [facility].    03/14/2024:  Follow-up for left ankle fibular fracture.  Relates pain 1/10.  She has not returned to work she is unable to stand for more than 20 minutes at a time with her boot in his requiring more time off from work.  Denies any slips, trips or falls.  Ambulating with a Cam boot as instructed.  Translation per [facility].    04/11/2024:  Follow-up for closed left ankle fracture.  Relates pain is 2/10.  Relates she is unable to return to work because she can not sit down in his force to stand for extended periods of time as a .  Wearing her cam boot.  Accompanied by her mother.  Translation per [facility].    05/13/2024:  Follow-up for left ankle lateral malleolus fracture.  Patient is ambulating with cam boot.  She describes mild intermittent aching pain rated at 2/10.  Inquiring about return to work.  Translation per [facility].    06/24/2024: Follow-up for left ankle lateral malleolus fracture.  She has returned to work and reports some pain towards the end of the workday.  Ambulating with casual shoes.  Translation per  "her friend.    Vitals:    06/24/24 1433   BP: 116/83   Pulse: 99   Weight: 61.7 kg (136 lb 0.4 oz)   Height: 5' 2" (1.575 m)   PainSc:   2      Past Medical History:   Diagnosis Date    Closed fracture of distal end of left fibula 01/18/2024       History reviewed. No pertinent surgical history.    Family History   Problem Relation Name Age of Onset    No Known Problems Mother      No Known Problems Father         Social History     Socioeconomic History    Marital status: Single   Tobacco Use    Smoking status: Never    Smokeless tobacco: Never   Substance and Sexual Activity    Alcohol use: Never       Current Outpatient Medications   Medication Sig Dispense Refill    acetaminophen (TYLENOL) 325 MG tablet Take 325 mg by mouth every 6 (six) hours as needed for Pain.      calcium-vitamin D3 (CALCIUM 500 + D) 500 mg-5 mcg (200 unit) per tablet Take 1 tablet by mouth 2 (two) times daily with meals. 60 tablet 11     No current facility-administered medications for this visit.       Review of patient's allergies indicates:  No Known Allergies      Review of Systems   Constitutional: Negative for chills, fever and malaise/fatigue.   HENT:  Negative for congestion and hearing loss.    Respiratory:  Negative for cough and shortness of breath.    Skin:  Positive for color change.   Gastrointestinal:  Negative for nausea and vomiting.   Psychiatric/Behavioral:  Negative for altered mental status.         Objective:     Physical Exam  Constitutional:       General: She is not in acute distress.     Appearance: Normal appearance. She is not ill-appearing.   Cardiovascular:      Pulses:           Dorsalis pedis pulses are 2+ on the left side.        Posterior tibial pulses are 2+ on the left side.   Musculoskeletal:      Comments: No pain on palpation directly over the fibula/lateral malleolus.  There is no pain on palpation along the peroneal tendons or with active resisted eversion in the neutral or plantar flexed position.  " No subluxation of the peroneal tendons with circumduction of the left ankle.  Mild pain on palpation along the dorsal anterior lateral aspect of the left ankle in the region of the ATF ligament.  There is some reduction active and passive range motion to left ankle.  No pain stress eversion or inversion of the left ankle.  Manual muscle strength to the left foot and ankle is 4/5.   Skin:     General: Skin is warm.      Capillary Refill: Capillary refill takes less than 2 seconds.      Findings: No ecchymosis.      Nails: There is no clubbing.   Neurological:      Mental Status: She is alert and oriented to person, place, and time.      Sensory: Sensation is intact.      Motor: Motor function is intact.           Assessment:      Encounter Diagnosis   Name Primary?    Closed fracture of distal end of left fibula with delayed healing, unspecified fracture morphology, subsequent encounter Yes     Plan:     Fallon was seen today for foot pain.    Diagnoses and all orders for this visit:    Closed fracture of distal end of left fibula with delayed healing, unspecified fracture morphology, subsequent encounter    Other orders  -     calcium-vitamin D3 (CALCIUM 500 + D) 500 mg-5 mcg (200 unit) per tablet; Take 1 tablet by mouth 2 (two) times daily with meals.      I counseled the patient on her conditions, their implications and medical management.    Reviewed left ankle x-ray completed today noting increased trabeculation of the oblique lateral malleolus fracture at the level of the syndesmosis with maintained alignment of the ankle mortise.  There was greater than 50% remodeling of the previous fracture site which appears to be stable and delayed in consolidation.  Patient appears to be doing well overall.  I did discuss a referral to physical therapy to help with some of the pain within the left ankle joint however she declined.  Her friend has some experience with some of the active range motion and resistance  exercises that we discussed today in clinic therefore she will guide her as well.    Patient to continue vitamin-D and calcium supplementation.      RTC p.r.n. as discussed.    A portion of this note was generated by voice recognition software and may contain spelling and grammar errors.  .